# Patient Record
Sex: FEMALE | Race: OTHER | NOT HISPANIC OR LATINO | URBAN - METROPOLITAN AREA
[De-identification: names, ages, dates, MRNs, and addresses within clinical notes are randomized per-mention and may not be internally consistent; named-entity substitution may affect disease eponyms.]

---

## 2019-06-13 ENCOUNTER — EMERGENCY (EMERGENCY)
Facility: HOSPITAL | Age: 34
LOS: 1 days | Discharge: HOME | End: 2019-06-13
Admitting: EMERGENCY MEDICINE
Payer: COMMERCIAL

## 2019-06-13 VITALS
SYSTOLIC BLOOD PRESSURE: 154 MMHG | HEART RATE: 87 BPM | OXYGEN SATURATION: 100 % | DIASTOLIC BLOOD PRESSURE: 106 MMHG | TEMPERATURE: 98 F | RESPIRATION RATE: 15 BRPM

## 2019-06-13 LAB
ALBUMIN SERPL ELPH-MCNC: 4.5 G/DL — SIGNIFICANT CHANGE UP (ref 3.3–5)
ALP SERPL-CCNC: 41 U/L — SIGNIFICANT CHANGE UP (ref 40–120)
ALT FLD-CCNC: 26 U/L — SIGNIFICANT CHANGE UP (ref 4–33)
ANION GAP SERPL CALC-SCNC: 12 MMO/L — SIGNIFICANT CHANGE UP (ref 7–14)
AST SERPL-CCNC: 22 U/L — SIGNIFICANT CHANGE UP (ref 4–32)
BASOPHILS # BLD AUTO: 0.05 K/UL — SIGNIFICANT CHANGE UP (ref 0–0.2)
BASOPHILS NFR BLD AUTO: 0.7 % — SIGNIFICANT CHANGE UP (ref 0–2)
BILIRUB SERPL-MCNC: 0.3 MG/DL — SIGNIFICANT CHANGE UP (ref 0.2–1.2)
BUN SERPL-MCNC: 16 MG/DL — SIGNIFICANT CHANGE UP (ref 7–23)
CALCIUM SERPL-MCNC: 10.3 MG/DL — SIGNIFICANT CHANGE UP (ref 8.4–10.5)
CHLORIDE SERPL-SCNC: 103 MMOL/L — SIGNIFICANT CHANGE UP (ref 98–107)
CO2 SERPL-SCNC: 24 MMOL/L — SIGNIFICANT CHANGE UP (ref 22–31)
CREAT SERPL-MCNC: 0.93 MG/DL — SIGNIFICANT CHANGE UP (ref 0.5–1.3)
EOSINOPHIL # BLD AUTO: 0.22 K/UL — SIGNIFICANT CHANGE UP (ref 0–0.5)
EOSINOPHIL NFR BLD AUTO: 2.9 % — SIGNIFICANT CHANGE UP (ref 0–6)
GLUCOSE SERPL-MCNC: 101 MG/DL — HIGH (ref 70–99)
HCT VFR BLD CALC: 39 % — SIGNIFICANT CHANGE UP (ref 34.5–45)
HGB BLD-MCNC: 12.8 G/DL — SIGNIFICANT CHANGE UP (ref 11.5–15.5)
IMM GRANULOCYTES NFR BLD AUTO: 0.3 % — SIGNIFICANT CHANGE UP (ref 0–1.5)
LYMPHOCYTES # BLD AUTO: 3.6 K/UL — HIGH (ref 1–3.3)
LYMPHOCYTES # BLD AUTO: 47.1 % — HIGH (ref 13–44)
MCHC RBC-ENTMCNC: 29.1 PG — SIGNIFICANT CHANGE UP (ref 27–34)
MCHC RBC-ENTMCNC: 32.8 % — SIGNIFICANT CHANGE UP (ref 32–36)
MCV RBC AUTO: 88.6 FL — SIGNIFICANT CHANGE UP (ref 80–100)
MONOCYTES # BLD AUTO: 0.33 K/UL — SIGNIFICANT CHANGE UP (ref 0–0.9)
MONOCYTES NFR BLD AUTO: 4.3 % — SIGNIFICANT CHANGE UP (ref 2–14)
NEUTROPHILS # BLD AUTO: 3.43 K/UL — SIGNIFICANT CHANGE UP (ref 1.8–7.4)
NEUTROPHILS NFR BLD AUTO: 44.7 % — SIGNIFICANT CHANGE UP (ref 43–77)
NRBC # FLD: 0 K/UL — SIGNIFICANT CHANGE UP (ref 0–0)
PLATELET # BLD AUTO: 293 K/UL — SIGNIFICANT CHANGE UP (ref 150–400)
PMV BLD: 10.9 FL — SIGNIFICANT CHANGE UP (ref 7–13)
POTASSIUM SERPL-MCNC: 4.2 MMOL/L — SIGNIFICANT CHANGE UP (ref 3.5–5.3)
POTASSIUM SERPL-SCNC: 4.2 MMOL/L — SIGNIFICANT CHANGE UP (ref 3.5–5.3)
PROT SERPL-MCNC: 7.5 G/DL — SIGNIFICANT CHANGE UP (ref 6–8.3)
RBC # BLD: 4.4 M/UL — SIGNIFICANT CHANGE UP (ref 3.8–5.2)
RBC # FLD: 11.8 % — SIGNIFICANT CHANGE UP (ref 10.3–14.5)
SODIUM SERPL-SCNC: 139 MMOL/L — SIGNIFICANT CHANGE UP (ref 135–145)
WBC # BLD: 7.65 K/UL — SIGNIFICANT CHANGE UP (ref 3.8–10.5)
WBC # FLD AUTO: 7.65 K/UL — SIGNIFICANT CHANGE UP (ref 3.8–10.5)

## 2019-06-13 PROCEDURE — 99284 EMERGENCY DEPT VISIT MOD MDM: CPT

## 2019-06-13 RX ORDER — KETOROLAC TROMETHAMINE 30 MG/ML
15 SYRINGE (ML) INJECTION ONCE
Refills: 0 | Status: DISCONTINUED | OUTPATIENT
Start: 2019-06-13 | End: 2019-06-13

## 2019-06-13 RX ORDER — DIAZEPAM 5 MG
1 TABLET ORAL
Qty: 9 | Refills: 0
Start: 2019-06-13 | End: 2019-06-15

## 2019-06-13 RX ORDER — DIAZEPAM 5 MG
5 TABLET ORAL ONCE
Refills: 0 | Status: DISCONTINUED | OUTPATIENT
Start: 2019-06-13 | End: 2019-06-13

## 2019-06-13 RX ORDER — LIDOCAINE 4 G/100G
2 CREAM TOPICAL ONCE
Refills: 0 | Status: COMPLETED | OUTPATIENT
Start: 2019-06-13 | End: 2019-06-13

## 2019-06-13 RX ADMIN — Medication 15 MILLIGRAM(S): at 18:05

## 2019-06-13 RX ADMIN — Medication 5 MILLIGRAM(S): at 18:05

## 2019-06-13 RX ADMIN — LIDOCAINE 2 PATCH: 4 CREAM TOPICAL at 18:05

## 2019-06-13 NOTE — ED ADULT TRIAGE NOTE - CHIEF COMPLAINT QUOTE
Pt c/o neck pain and joint pain for the past two weeks.  Pt states was started on lamictal approx 6 weeks ago.  Then approx 2 weeks ago, she started to feel the symptoms and is concerned it is a reaction to the medication.  PMHx: bipolar

## 2019-06-13 NOTE — ED PROVIDER NOTE - MUSCULOSKELETAL NECK EXAM
paracervical and trapezius muscle tenderness and spasm jennifer, however no midline tenderness, neck supple/trachea midline/supple

## 2019-06-13 NOTE — ED PROVIDER NOTE - NS ED ROS FT
Review of Systems:     CONSTITUTIONAL: no fever; no chills, no weight loss  EYES: no eye pain, no blurry vision  ENMT:  no difficulty hearing, no sinus or throat pain  RESPIRATORY: no cough, no shortness of breath   CARDIOVASCULAR: no chest pain, no palpitations, no lower extremity edema, no syncope   GASTROINTESTINAL: no abdominal pain, no nausea, no vomiting, no diarrhea, no constipation, no melena  GENITOURINARY: no dysuria, no frequency, no hematuria, no incontinence  NEUROLOGICAL: no headaches, no dizziness, no numbness, no tingling, no ataxia  SKIN: no itching/burning, no rashes, no lesions   MUSCULOSKELETAL: +neck pain, +back pain

## 2019-06-13 NOTE — ED PROVIDER NOTE - PROGRESS NOTE DETAILS
ERMIAS Pan: Pt reassessed, neck pain and ROM significantly improved. WIll dc w/ valium and pain management referral.

## 2019-06-13 NOTE — ED PROVIDER NOTE - NSFOLLOWUPINSTRUCTIONS_ED_ALL_ED_FT
Follow with your PMD within 48-72 hours.  Rest, no heavy lifting or strenuous activity.  Warm compresses to area. Recommend follow up with pain management to consider trigger point injections, continue Physical Therapy- referral list provided.  Light walking. Continue Naproxen for pain, Valium 5mg every 8 hours as needed for muscle spasm- caution drowsiness/do not drive. Any worsening pain, weakness, numbness, bowel or urinary incontinence return to ER

## 2019-06-13 NOTE — ED PROVIDER NOTE - CLINICAL SUMMARY MEDICAL DECISION MAKING FREE TEXT BOX
35 y/o F w/ neck pain x 1 week sp starting Lamotrigine for bipolar disorder. Already saw an orthopedist who performed xrays and attributed pain to muscle spasm, pt getting physical therapy however continues experiencing pain. Pt is also anxious and worried about her pain, which is likely making muscle spasms and neck stiffness worse. Pt concerned for meningitis although she understands she does not have any other sx of meninigitis including HA, fever, n/v, visual changes. Will treat pain w/ toradol, valium muscle relaxer, will need outpt pain management follow up for likely trigger point injections

## 2019-06-13 NOTE — ED PROVIDER NOTE - OBJECTIVE STATEMENT
35 y/o F PMHx Bipolar (well managed, followed by psych closely) presents w/  at bedside, c/o neck pain x 1 week. Pt states she initially had pain in her R hip, which then traveled to her neck. Feels the pain maximally at the midline of her c-spine, traveling down to her lower back. Saw an orthopedist who performed xrays and attributed pain to muscle spasms, given straightening of cervical curvature on xray. Pt was prescribed physical therapy for 5 weeks, which she has been getting for the past week. States pain is worse w/ rest, better when she tries to do her daily activities and keep busy. Tried both Naproxen and Meloxicam for pain, only getting relief from Naproxen. Pt was not given muscle relaxer. Of note, pt was started on a titration of Lamotrigine prior to onset of neck pain, and attributes sx to that. Pt spoke w/ her psychiatrist, who performed Lamotrigine levels (pending results), but does not believe sx are related to that. Pt is here because she is concerned she has meningitis. Denies headaches, n/v, vision changes, photophobia, or any other complaints.

## 2019-10-22 ENCOUNTER — TRANSCRIPTION ENCOUNTER (OUTPATIENT)
Age: 34
End: 2019-10-22

## 2020-01-13 PROBLEM — F31.81 BIPOLAR II DISORDER: Chronic | Status: ACTIVE | Noted: 2019-06-13

## 2020-01-30 ENCOUNTER — APPOINTMENT (OUTPATIENT)
Dept: OBGYN | Facility: CLINIC | Age: 35
End: 2020-01-30

## 2020-05-05 PROBLEM — Z00.00 ENCOUNTER FOR PREVENTIVE HEALTH EXAMINATION: Status: ACTIVE | Noted: 2020-05-05

## 2020-05-14 ENCOUNTER — OUTPATIENT (OUTPATIENT)
Dept: OUTPATIENT SERVICES | Age: 35
LOS: 1 days | Discharge: HOME | End: 2020-05-14

## 2020-05-15 ENCOUNTER — APPOINTMENT (OUTPATIENT)
Dept: PEDIATRIC CARDIOLOGY | Facility: CLINIC | Age: 35
End: 2020-05-15
Payer: COMMERCIAL

## 2020-05-15 PROCEDURE — 93325 DOPPLER ECHO COLOR FLOW MAPG: CPT | Mod: 59

## 2020-05-15 PROCEDURE — 76820 UMBILICAL ARTERY ECHO: CPT

## 2020-05-15 PROCEDURE — 76825 ECHO EXAM OF FETAL HEART: CPT

## 2020-05-15 PROCEDURE — 76821 MIDDLE CEREBRAL ARTERY ECHO: CPT

## 2020-05-15 PROCEDURE — 76827 ECHO EXAM OF FETAL HEART: CPT

## 2020-05-15 PROCEDURE — 99201 OFFICE OUTPATIENT NEW 10 MINUTES: CPT | Mod: 25

## 2020-09-17 ENCOUNTER — OUTPATIENT (OUTPATIENT)
Dept: INPATIENT UNIT | Facility: HOSPITAL | Age: 35
LOS: 1 days | Discharge: HOME | End: 2020-09-17
Payer: COMMERCIAL

## 2020-09-17 VITALS — TEMPERATURE: 98 F | RESPIRATION RATE: 16 BRPM

## 2020-09-17 VITALS — HEART RATE: 86 BPM | DIASTOLIC BLOOD PRESSURE: 60 MMHG | SYSTOLIC BLOOD PRESSURE: 118 MMHG

## 2020-09-17 DIAGNOSIS — Z3A.00 WEEKS OF GESTATION OF PREGNANCY NOT SPECIFIED: ICD-10-CM

## 2020-09-17 DIAGNOSIS — Z90.89 ACQUIRED ABSENCE OF OTHER ORGANS: Chronic | ICD-10-CM

## 2020-09-17 DIAGNOSIS — O26.899 OTHER SPECIFIED PREGNANCY RELATED CONDITIONS, UNSPECIFIED TRIMESTER: ICD-10-CM

## 2020-09-17 PROCEDURE — 99214 OFFICE O/P EST MOD 30 MIN: CPT

## 2020-09-17 NOTE — OB PROVIDER TRIAGE NOTE - NSOBPROVIDERNOTE_OBGYN_ALL_OB_FT
35 yr old  @ 39.5 wks, r/o labor 35 yr old  @ 39.5 wks, r/o labor  -Fetal status reassured  -NST: cat 1  -TOCO: ctx irregular,   -labor precautions  -Comfortable

## 2020-09-17 NOTE — OB PROVIDER TRIAGE NOTE - HISTORY OF PRESENT ILLNESS
35 yr ol d @ 39.5 wks. presents with reports of overactive fetal movement with occasional ctx. Denies VB/LOF. GBS negative, [id number]  PNI:  marginal cord insertion, fetal growth wnl  Obhx: 2012, FT , 8lb            2010, FT, VSVD, 7.2 lbs  Gynhx: denies  Surghx: denies  Gynhx: denies 35 yr ol d @ 39.5 wks. presents with reports of overactive fetal movement with occasional ctx. Denies VB/LOF. GBS negative, [id number]  PNI:  marginal cord insertion, fetal growth wnl, ultrasound in office in AM wnl  Obhx: 2012, FT , 8lb            2010, FT, VSVD, 7.2 lbs  Gynhx: denies  Surghx: denies  Gynhx: denies

## 2020-09-17 NOTE — OB PROVIDER TRIAGE NOTE - NSHPPHYSICALEXAM_GEN_ALL_CORE
VS: 114/69  TOCO: ctx  10 min  NST:  , +accel, -decels, moderate variability  VE; VS: 114/69  TOCO: ctx  10 min  NST:  , +accel, -decels, moderate variability  VE; FT/L/H  Sono: presentation only

## 2020-09-21 ENCOUNTER — TRANSCRIPTION ENCOUNTER (OUTPATIENT)
Age: 35
End: 2020-09-21

## 2020-09-22 ENCOUNTER — INPATIENT (INPATIENT)
Facility: HOSPITAL | Age: 35
LOS: 1 days | Discharge: HOME | End: 2020-09-24
Attending: OBSTETRICS & GYNECOLOGY | Admitting: OBSTETRICS & GYNECOLOGY
Payer: COMMERCIAL

## 2020-09-22 ENCOUNTER — RESULT REVIEW (OUTPATIENT)
Age: 35
End: 2020-09-22

## 2020-09-22 VITALS
TEMPERATURE: 98 F | DIASTOLIC BLOOD PRESSURE: 74 MMHG | HEART RATE: 83 BPM | SYSTOLIC BLOOD PRESSURE: 125 MMHG | RESPIRATION RATE: 18 BRPM

## 2020-09-22 DIAGNOSIS — Z90.89 ACQUIRED ABSENCE OF OTHER ORGANS: Chronic | ICD-10-CM

## 2020-09-22 DIAGNOSIS — O26.899 OTHER SPECIFIED PREGNANCY RELATED CONDITIONS, UNSPECIFIED TRIMESTER: ICD-10-CM

## 2020-09-22 DIAGNOSIS — Z3A.00 WEEKS OF GESTATION OF PREGNANCY NOT SPECIFIED: ICD-10-CM

## 2020-09-22 LAB
ANTIBODY ID 1_1: SIGNIFICANT CHANGE UP
BASOPHILS # BLD AUTO: 0.05 K/UL — SIGNIFICANT CHANGE UP (ref 0–0.2)
BASOPHILS NFR BLD AUTO: 0.4 % — SIGNIFICANT CHANGE UP (ref 0–2)
BLD GP AB SCN SERPL QL: POSITIVE — SIGNIFICANT CHANGE UP
DAT POLY-SP REAG RBC QL: NEGATIVE — SIGNIFICANT CHANGE UP
EOSINOPHIL # BLD AUTO: 0.1 K/UL — SIGNIFICANT CHANGE UP (ref 0–0.5)
EOSINOPHIL NFR BLD AUTO: 0.8 % — SIGNIFICANT CHANGE UP (ref 0–6)
HCT VFR BLD CALC: 38.6 % — SIGNIFICANT CHANGE UP (ref 34.5–45)
HGB BLD-MCNC: 12.6 G/DL — SIGNIFICANT CHANGE UP (ref 11.5–15.5)
IMM GRANULOCYTES NFR BLD AUTO: 0.9 % — SIGNIFICANT CHANGE UP (ref 0–1.5)
LYMPHOCYTES # BLD AUTO: 26.1 % — SIGNIFICANT CHANGE UP (ref 13–44)
LYMPHOCYTES # BLD AUTO: 3.2 K/UL — SIGNIFICANT CHANGE UP (ref 1–3.3)
MCHC RBC-ENTMCNC: 29 PG — SIGNIFICANT CHANGE UP (ref 27–34)
MCHC RBC-ENTMCNC: 32.6 % — SIGNIFICANT CHANGE UP (ref 32–36)
MCV RBC AUTO: 88.7 FL — SIGNIFICANT CHANGE UP (ref 80–100)
MONOCYTES # BLD AUTO: 0.6 K/UL — SIGNIFICANT CHANGE UP (ref 0–0.9)
MONOCYTES NFR BLD AUTO: 4.9 % — SIGNIFICANT CHANGE UP (ref 2–14)
NEUTROPHILS # BLD AUTO: 8.18 K/UL — HIGH (ref 1.8–7.4)
NEUTROPHILS NFR BLD AUTO: 66.9 % — SIGNIFICANT CHANGE UP (ref 43–77)
NRBC # FLD: 0 K/UL — SIGNIFICANT CHANGE UP (ref 0–0)
PLATELET # BLD AUTO: 204 K/UL — SIGNIFICANT CHANGE UP (ref 150–400)
PMV BLD: 11.9 FL — SIGNIFICANT CHANGE UP (ref 7–13)
RBC # BLD: 4.35 M/UL — SIGNIFICANT CHANGE UP (ref 3.8–5.2)
RBC # FLD: 12.8 % — SIGNIFICANT CHANGE UP (ref 10.3–14.5)
RH IG SCN BLD-IMP: NEGATIVE — SIGNIFICANT CHANGE UP
RH IG SCN BLD-IMP: NEGATIVE — SIGNIFICANT CHANGE UP
SARS-COV-2 RNA SPEC QL NAA+PROBE: SIGNIFICANT CHANGE UP
WBC # BLD: 12.24 K/UL — HIGH (ref 3.8–10.5)
WBC # FLD AUTO: 12.24 K/UL — HIGH (ref 3.8–10.5)

## 2020-09-22 PROCEDURE — 88307 TISSUE EXAM BY PATHOLOGIST: CPT | Mod: 26

## 2020-09-22 PROCEDURE — 86077 PHYS BLOOD BANK SERV XMATCH: CPT

## 2020-09-22 RX ORDER — OXYCODONE HYDROCHLORIDE 5 MG/1
5 TABLET ORAL
Refills: 0 | Status: DISCONTINUED | OUTPATIENT
Start: 2020-09-22 | End: 2020-09-24

## 2020-09-22 RX ORDER — MAGNESIUM HYDROXIDE 400 MG/1
30 TABLET, CHEWABLE ORAL
Refills: 0 | Status: DISCONTINUED | OUTPATIENT
Start: 2020-09-22 | End: 2020-09-24

## 2020-09-22 RX ORDER — TETANUS TOXOID, REDUCED DIPHTHERIA TOXOID AND ACELLULAR PERTUSSIS VACCINE, ADSORBED 5; 2.5; 8; 8; 2.5 [IU]/.5ML; [IU]/.5ML; UG/.5ML; UG/.5ML; UG/.5ML
0.5 SUSPENSION INTRAMUSCULAR ONCE
Refills: 0 | Status: DISCONTINUED | OUTPATIENT
Start: 2020-09-22 | End: 2020-09-24

## 2020-09-22 RX ORDER — ACETAMINOPHEN 500 MG
975 TABLET ORAL
Refills: 0 | Status: DISCONTINUED | OUTPATIENT
Start: 2020-09-22 | End: 2020-09-24

## 2020-09-22 RX ORDER — SIMETHICONE 80 MG/1
80 TABLET, CHEWABLE ORAL EVERY 4 HOURS
Refills: 0 | Status: DISCONTINUED | OUTPATIENT
Start: 2020-09-22 | End: 2020-09-24

## 2020-09-22 RX ORDER — NALOXONE HYDROCHLORIDE 4 MG/.1ML
0.1 SPRAY NASAL
Refills: 0 | Status: DISCONTINUED | OUTPATIENT
Start: 2020-09-23 | End: 2020-09-23

## 2020-09-22 RX ORDER — AZTREONAM 2 G
2000 VIAL (EA) INJECTION EVERY 12 HOURS
Refills: 0 | Status: COMPLETED | OUTPATIENT
Start: 2020-09-23 | End: 2020-09-24

## 2020-09-22 RX ORDER — HYDROMORPHONE HYDROCHLORIDE 2 MG/ML
1 INJECTION INTRAMUSCULAR; INTRAVENOUS; SUBCUTANEOUS
Refills: 0 | Status: DISCONTINUED | OUTPATIENT
Start: 2020-09-23 | End: 2020-09-23

## 2020-09-22 RX ORDER — OXYTOCIN 10 UNIT/ML
333.33 VIAL (ML) INJECTION
Qty: 20 | Refills: 0 | Status: DISCONTINUED | OUTPATIENT
Start: 2020-09-22 | End: 2020-09-23

## 2020-09-22 RX ORDER — ACETAMINOPHEN 500 MG
1000 TABLET ORAL ONCE
Refills: 0 | Status: DISCONTINUED | OUTPATIENT
Start: 2020-09-22 | End: 2020-09-22

## 2020-09-22 RX ORDER — KETOROLAC TROMETHAMINE 30 MG/ML
30 SYRINGE (ML) INJECTION EVERY 6 HOURS
Refills: 0 | Status: COMPLETED | OUTPATIENT
Start: 2020-09-22 | End: 2020-09-23

## 2020-09-22 RX ORDER — METOCLOPRAMIDE HCL 10 MG
10 TABLET ORAL ONCE
Refills: 0 | Status: COMPLETED | OUTPATIENT
Start: 2020-09-22 | End: 2020-09-22

## 2020-09-22 RX ORDER — BUTORPHANOL TARTRATE 2 MG/ML
0.12 INJECTION, SOLUTION INTRAMUSCULAR; INTRAVENOUS EVERY 6 HOURS
Refills: 0 | Status: DISCONTINUED | OUTPATIENT
Start: 2020-09-23 | End: 2020-09-23

## 2020-09-22 RX ORDER — SODIUM CHLORIDE 9 MG/ML
1000 INJECTION, SOLUTION INTRAVENOUS
Refills: 0 | Status: DISCONTINUED | OUTPATIENT
Start: 2020-09-22 | End: 2020-09-22

## 2020-09-22 RX ORDER — OXYCODONE HYDROCHLORIDE 5 MG/1
5 TABLET ORAL
Refills: 0 | Status: DISCONTINUED | OUTPATIENT
Start: 2020-09-23 | End: 2020-09-24

## 2020-09-22 RX ORDER — FAMOTIDINE 10 MG/ML
20 INJECTION INTRAVENOUS ONCE
Refills: 0 | Status: COMPLETED | OUTPATIENT
Start: 2020-09-22 | End: 2020-09-22

## 2020-09-22 RX ORDER — LANOLIN
1 OINTMENT (GRAM) TOPICAL EVERY 6 HOURS
Refills: 0 | Status: DISCONTINUED | OUTPATIENT
Start: 2020-09-22 | End: 2020-09-24

## 2020-09-22 RX ORDER — ONDANSETRON 8 MG/1
4 TABLET, FILM COATED ORAL EVERY 6 HOURS
Refills: 0 | Status: DISCONTINUED | OUTPATIENT
Start: 2020-09-23 | End: 2020-09-24

## 2020-09-22 RX ORDER — SODIUM CHLORIDE 9 MG/ML
300 INJECTION INTRAMUSCULAR; INTRAVENOUS; SUBCUTANEOUS ONCE
Refills: 0 | Status: COMPLETED | OUTPATIENT
Start: 2020-09-22 | End: 2020-09-22

## 2020-09-22 RX ORDER — OXYCODONE HYDROCHLORIDE 5 MG/1
5 TABLET ORAL ONCE
Refills: 0 | Status: DISCONTINUED | OUTPATIENT
Start: 2020-09-22 | End: 2020-09-24

## 2020-09-22 RX ORDER — DIPHENHYDRAMINE HCL 50 MG
25 CAPSULE ORAL EVERY 6 HOURS
Refills: 0 | Status: DISCONTINUED | OUTPATIENT
Start: 2020-09-22 | End: 2020-09-24

## 2020-09-22 RX ORDER — AZTREONAM 2 G
VIAL (EA) INJECTION
Refills: 0 | Status: COMPLETED | OUTPATIENT
Start: 2020-09-22 | End: 2020-09-25

## 2020-09-22 RX ORDER — AZTREONAM 2 G
2000 VIAL (EA) INJECTION ONCE
Refills: 0 | Status: COMPLETED | OUTPATIENT
Start: 2020-09-22 | End: 2020-09-22

## 2020-09-22 RX ORDER — HEPARIN SODIUM 5000 [USP'U]/ML
5000 INJECTION INTRAVENOUS; SUBCUTANEOUS EVERY 12 HOURS
Refills: 0 | Status: DISCONTINUED | OUTPATIENT
Start: 2020-09-22 | End: 2020-09-24

## 2020-09-22 RX ORDER — CITRIC ACID/SODIUM CITRATE 300-500 MG
30 SOLUTION, ORAL ORAL ONCE
Refills: 0 | Status: COMPLETED | OUTPATIENT
Start: 2020-09-22 | End: 2020-09-22

## 2020-09-22 RX ORDER — OXYTOCIN 10 UNIT/ML
VIAL (ML) INJECTION
Qty: 20 | Refills: 0 | Status: DISCONTINUED | OUTPATIENT
Start: 2020-09-22 | End: 2020-09-22

## 2020-09-22 RX ORDER — FERROUS SULFATE 325(65) MG
325 TABLET ORAL DAILY
Refills: 0 | Status: DISCONTINUED | OUTPATIENT
Start: 2020-09-22 | End: 2020-09-23

## 2020-09-22 RX ORDER — SODIUM CHLORIDE 9 MG/ML
500 INJECTION INTRAMUSCULAR; INTRAVENOUS; SUBCUTANEOUS
Refills: 0 | Status: DISCONTINUED | OUTPATIENT
Start: 2020-09-22 | End: 2020-09-22

## 2020-09-22 RX ORDER — IBUPROFEN 200 MG
600 TABLET ORAL EVERY 6 HOURS
Refills: 0 | Status: COMPLETED | OUTPATIENT
Start: 2020-09-22 | End: 2021-08-21

## 2020-09-22 RX ORDER — OXYTOCIN 10 UNIT/ML
2 VIAL (ML) INJECTION
Qty: 30 | Refills: 0 | Status: DISCONTINUED | OUTPATIENT
Start: 2020-09-22 | End: 2020-09-22

## 2020-09-22 RX ADMIN — Medication 100 MILLIGRAM(S): at 15:26

## 2020-09-22 RX ADMIN — Medication 0.25 MILLIGRAM(S): at 10:31

## 2020-09-22 RX ADMIN — FAMOTIDINE 20 MILLIGRAM(S): 10 INJECTION INTRAVENOUS at 12:43

## 2020-09-22 RX ADMIN — Medication 975 MILLIGRAM(S): at 21:16

## 2020-09-22 RX ADMIN — HEPARIN SODIUM 5000 UNIT(S): 5000 INJECTION INTRAVENOUS; SUBCUTANEOUS at 21:16

## 2020-09-22 RX ADMIN — Medication 975 MILLIGRAM(S): at 22:00

## 2020-09-22 RX ADMIN — Medication 10 MILLIGRAM(S): at 12:43

## 2020-09-22 RX ADMIN — SODIUM CHLORIDE 300 MILLILITER(S): 9 INJECTION INTRAMUSCULAR; INTRAVENOUS; SUBCUTANEOUS at 12:30

## 2020-09-22 RX ADMIN — Medication 30 MILLILITER(S): at 12:43

## 2020-09-22 RX ADMIN — Medication 0.25 MILLIGRAM(S): at 12:42

## 2020-09-22 RX ADMIN — SODIUM CHLORIDE 125 MILLILITER(S): 9 INJECTION, SOLUTION INTRAVENOUS at 08:14

## 2020-09-22 RX ADMIN — Medication 325 MILLIGRAM(S): at 21:16

## 2020-09-22 NOTE — PROVIDER CONTACT NOTE (OTHER) - SITUATION
Pt H&P states that pt has been diagnosed with Bipolar 2 disorder. Pt did not mention this to the night nurse during admission. Brought to attention during AM safety rounds.

## 2020-09-22 NOTE — CHART NOTE - NSCHARTNOTEFT_GEN_A_CORE
R4 OB Chart Note     Patient seen at bedside with Dr. Gaines - patient remains remote from delivery with variable decelerations unresponsive to amnioinfusion. Counseled patient that recommend to proceed with  delivery. Patient and  agree and consents signed upon discussion of risks/benefits. Huddle with nursing and Dr. Carvalho of anesthesia, will proceed to OR now.     GITA Rebollar PGY4

## 2020-09-22 NOTE — CHART NOTE - NSCHARTNOTEFT_GEN_A_CORE
R4 OB Chart Note     Patient seen for cervical change     Vital Signs Last 24 Hrs  T(C): 36.7 (22 Sep 2020 11:38), Max: 37.4 (22 Sep 2020 10:57)  T(F): 98.06 (22 Sep 2020 11:38), Max: 99.32 (22 Sep 2020 10:57)  HR: 96 (22 Sep 2020 12:08) (45 - 110)  BP: 117/64 (22 Sep 2020 12:08) (99/73 - 139/86)  RR: 16 (22 Sep 2020 04:28) (16 - 18)  SpO2: 100% (22 Sep 2020 12:07) (92% - 100%)    VE: 8/90/-3, LOP position  FHT: 150/mod/accels-/intermittent variable decels  [place]: q1-3 min     A/P: 34yo  at 40w3d presenting in labor, with tracing recovered to category 1, now with variable deceleration noted upon examination, in stable condition.   - Allow tracing to recover and anesthesia called for reinforcement dosing   - If variable persist, will begin amnioinfusion, as IUPC/ISE in place   - Would consider pitocin with recovery of tracing     Dw Dr. Eder Rebollar PGY4

## 2020-09-22 NOTE — OB PROVIDER DELIVERY SUMMARY - NSPROVIDERDELIVERYNOTE_OBGYN_ALL_OB_FT
viable male infant, APGARS 9/9, wt 3370g, RUTH, light mec  hysterotomy with left side extension repaired with caprosyn  hemostasis of hysterotomy achieved with imbricating throws of caprosyn in figure of 8, +fibrillar  grossly normal uterus, tubes and ovaries b/l  bladder retrograde filled with 300cc methylene blue, w/o extravasation  fascia closed with vicryl, subQ closed with plain gut, subcuticular vicryl  1000/2500/500

## 2020-09-22 NOTE — CHART NOTE - NSCHARTNOTEFT_GEN_A_CORE
R4 OB Chart Note    Patient seen at bedside with Dr. Sharpe, noted to have mild variable decelerations    Vital Signs Last 24 Hrs  T(C): 37.0 (22 Sep 2020 08:58), Max: 37.0 (22 Sep 2020 08:58)  T(F): 98.6 (22 Sep 2020 08:58), Max: 98.6 (22 Sep 2020 08:58)  HR: 82 (22 Sep 2020 10:27) (45 - 105)  BP: 120/71 (22 Sep 2020 10:24) (99/73 - 139/86)  RR: 16 (22 Sep 2020 04:28) (16 - 18)  SpO2: 100% (22 Sep 2020 10:27) (92% - 100%)    VE: 8/100/-3, LOP position   FHT: 145/mod/accels+/variable decelerations noted   [place]: q1-2min     A/P: 36yo  at 40w3d presenting in labor, now with variable decelerations, in stable condition.   - IUPC placed by Dr. Gaines with contractions noted q1-2 min   - Resuscitation in progress   - Terbutaline x1 given to decrease contraction frequency and allow resuscitation  - Will continue to monitor     Seen w/ Dr. Sharpe  LRobinson PGY4

## 2020-09-22 NOTE — CHART NOTE - NSCHARTNOTEFT_GEN_A_CORE
R2 Progress Note    Pt checked for cervical change after epidural.    Vital Signs Last 24 Hrs  T(C): 36.6 (22 Sep 2020 05:09), Max: 36.6 (22 Sep 2020 04:28)  HR: 82 (22 Sep 2020 07:02) (67 - 93)  BP: 116/64 (22 Sep 2020 06:53) (111/69 - 137/64)  RR: 16 (22 Sep 2020 04:28) (16 - 18)  SpO2: 100% (22 Sep 2020 06:57) (100% - 100%)    /mod/-accel/-decel  [place] q2-3min  VE 6/80/-3    peanut ball  epi in place  expt mgmt    D/w L Smooth R4  Sonia Hough R2

## 2020-09-22 NOTE — PROVIDER CONTACT NOTE (OTHER) - BACKGROUND
pt s/p primary c/s for category II tracing to a full term . pt hx of bipolar disorder 2, anxiety, depression, VAVD  FT and  in .
Pt is a G3 now P 3003. Pt has a hx of Post partum depression after  in 2012. Dr Faust reached out to Dr Arango who stated that pt has a GYN history of anxiety and depression.

## 2020-09-22 NOTE — CHART NOTE - NSCHARTNOTEFT_GEN_A_CORE
NP note    Called to room by RN to assess pt for c/o yellow spot floaters x2 episodes for "couple of minutes" 40 minutes ago upon waking and 5 minutes ago.   Report she gets the same floaters before if she is experiencing nasal congestion. Denies hx of migraines or any BP issues.  States she only got 2 hours of sleep and it may be due to waking up.  Denies headache, dizziness, chest pain or sob.   VSS, normotensive, afebrile  CAT I tracing ,ctx q-2-3min  SVE 15 minutes ago 6/80/-3 high on peanut ball    -For VE @9am and possible AROM  -Continue monitor closely    sredze, NP

## 2020-09-22 NOTE — CHART NOTE - NSCHARTNOTEFT_GEN_A_CORE
Pt was seen and evaluated at bedside.     POD#0 from a pLTCS for NRFHT. Pt states that she is feeling anxious and slightly lightheaded. Pt states that she has a hx of panic attack but denies ever being on medication. Pt states that she is feeling anxious from being in the PACU for too long and for wearing the SCDs which are constraining her movement.     VSS  RR  CTAB  Vaginal: minimal lochia  Abd: appropriately tender, firm uterus  Rose in site    A: Pt's signs/symptoms are consistent with an exacerbation of her underlying anxiety.  -continue with IV fluids  -SW consult placed  - counseled pt about the standard postoperative procedure and reassured her that she will be moved to the floors as soon as she meets the appropriate postoperative milestone.    Simone Castillo PGY1  D/w Dr. Orr Pt was seen and evaluated at bedside.     POD#0 from a pLTCS for NRFHT. Pt states that she is feeling anxious and slightly lightheaded. Pt states that she has a hx of panic attack but denies ever being on medication. Pt states that she is feeling anxious from being in the PACU for too long and for wearing the SCDs which are constraining her movement. Pt is tolerating PO fluids.    VSS  RR  CTAB  Vaginal: minimal lochia  Abd: appropriately tender, firm uterus  Rose in site    UOP: adequate    A: Pt's signs/symptoms are consistent with an exacerbation of her underlying anxiety.  -continue with IV fluids  -SW consult placed  - counseled pt about the standard postoperative procedure and reassured her that she will be moved to the floors as soon as she meets the appropriate postoperative milestone.  -AM CBC or PRN as indicated.  - will re-evaluate in the AM the need to place a Psych consult    Simone Castillo PGY1  D/w Dr. Orr

## 2020-09-22 NOTE — OB PROVIDER TRIAGE NOTE - HISTORY OF PRESENT ILLNESS
Dr. Arango's pt. is a 34y/o  EGA 40.3wks reports abdominal contractions since 12am every 3-4mins. Pain 8/10. Pt. denies leakage of fluid and vaginal bleeding. Pt. reports good fetal movement.

## 2020-09-22 NOTE — PROVIDER CONTACT NOTE (OTHER) - ASSESSMENT
pt axox4. vitals are stable ( see flowsheet). pt lungs clear. pt fundus is firm and at umbilicus. pt anxious. pt had panic attack while in PACU. RN explained treatment process, medications and care to patient. R pt states she would like to speak with patient about dizziness. pt refuses to put in lying position. pt tolerating po fluids.
Pt stated she was diagnosed with Bipolar 2 disorder but both the pt and  disagree with the diagnosis. Pt reports taking medications for a hx of anxiety and depression - pt stated she took abilify 2mg prior to pregnancy. Pt stated she was trying other medications as well- simbalta, trintelex, welbutrin. Pt stated she had a bad reaction to lamictal. Pt stated NO medications during pregnancy

## 2020-09-22 NOTE — OB PROVIDER TRIAGE NOTE - NSHPPHYSICALEXAM_GEN_ALL_CORE
BP: 125/74, HR: 83, Temp: 36.5  Abdomen soft nontender  SVE: 3/70/-3  TAS:   GBS: Neg. as per pt  EFW: 3400gms by leopolds  FHR: 135bpm, moderate variability, accelerations, no decelerations  [place] every 3-5mins

## 2020-09-22 NOTE — OB NEONATOLOGY/PEDIATRICIAN DELIVERY SUMMARY - NSPEDSNEONOTESA_OBGYN_ALL_OB_FT
Baby Boy Yi born at 40+3 via an emergent C/S for NRFHT to a 37yo  A- (Rhogam at 28w), PNL neg/NR/I, GBS neg () mother. Maternal hx significant for bipolar disorder type 2 (no meds during pregnancy), anxiety and depression (welbutrin and abilify prior to pregnancy). Covid negative . Baby emerged vigorous and crying through meconium stained fluid. Delayed cord clamping 30s. WDSS. Apgar /9. Admit to . Wants to breastfeed, wants Hep B vaccine, wants circ. Peds: [facility]. NICU attending Dr. Alfredo present at delivery.

## 2020-09-22 NOTE — OB RN DELIVERY SUMMARY - NS_SEPSISRSKCALC_OBGYN_ALL_OB_FT
EOS calculated successfully. EOS Risk Factor: 0.5/1000 live births ([facility] [facility] [place] incidence[place] GA=40w3d; Temp=99.32; ROM=4.583; GBS='Negative'; Antibiotics='No antibiotics or any antibiotics < 2 hrs prior to birth'

## 2020-09-23 LAB
BASOPHILS # BLD AUTO: 0.04 K/UL — SIGNIFICANT CHANGE UP (ref 0–0.2)
BASOPHILS NFR BLD AUTO: 0.3 % — SIGNIFICANT CHANGE UP (ref 0–2)
EOSINOPHIL # BLD AUTO: 0.03 K/UL — SIGNIFICANT CHANGE UP (ref 0–0.5)
EOSINOPHIL NFR BLD AUTO: 0.2 % — SIGNIFICANT CHANGE UP (ref 0–6)
HCT VFR BLD CALC: 24.7 % — LOW (ref 34.5–45)
HGB BLD-MCNC: 8.2 G/DL — LOW (ref 11.5–15.5)
IMM GRANULOCYTES NFR BLD AUTO: 0.9 % — SIGNIFICANT CHANGE UP (ref 0–1.5)
LYMPHOCYTES # BLD AUTO: 15.2 % — SIGNIFICANT CHANGE UP (ref 13–44)
LYMPHOCYTES # BLD AUTO: 2.29 K/UL — SIGNIFICANT CHANGE UP (ref 1–3.3)
MCHC RBC-ENTMCNC: 29.4 PG — SIGNIFICANT CHANGE UP (ref 27–34)
MCHC RBC-ENTMCNC: 33.2 % — SIGNIFICANT CHANGE UP (ref 32–36)
MCV RBC AUTO: 88.5 FL — SIGNIFICANT CHANGE UP (ref 80–100)
MONOCYTES # BLD AUTO: 0.75 K/UL — SIGNIFICANT CHANGE UP (ref 0–0.9)
MONOCYTES NFR BLD AUTO: 5 % — SIGNIFICANT CHANGE UP (ref 2–14)
NEUTROPHILS # BLD AUTO: 11.85 K/UL — HIGH (ref 1.8–7.4)
NEUTROPHILS NFR BLD AUTO: 78.4 % — HIGH (ref 43–77)
NRBC # FLD: 0 K/UL — SIGNIFICANT CHANGE UP (ref 0–0)
PLATELET # BLD AUTO: 137 K/UL — LOW (ref 150–400)
PMV BLD: 11.4 FL — SIGNIFICANT CHANGE UP (ref 7–13)
RBC # BLD FETUS AUTO: 0.1 — SIGNIFICANT CHANGE UP
RBC # BLD: 2.79 M/UL — LOW (ref 3.8–5.2)
RBC # FLD: 13 % — SIGNIFICANT CHANGE UP (ref 10.3–14.5)
WBC # BLD: 15.1 K/UL — HIGH (ref 3.8–10.5)
WBC # FLD AUTO: 15.1 K/UL — HIGH (ref 3.8–10.5)

## 2020-09-23 RX ORDER — IBUPROFEN 200 MG
600 TABLET ORAL EVERY 6 HOURS
Refills: 0 | Status: DISCONTINUED | OUTPATIENT
Start: 2020-09-23 | End: 2020-09-24

## 2020-09-23 RX ORDER — SENNA PLUS 8.6 MG/1
2 TABLET ORAL AT BEDTIME
Refills: 0 | Status: DISCONTINUED | OUTPATIENT
Start: 2020-09-23 | End: 2020-09-24

## 2020-09-23 RX ORDER — FERROUS SULFATE 325(65) MG
325 TABLET ORAL THREE TIMES A DAY
Refills: 0 | Status: DISCONTINUED | OUTPATIENT
Start: 2020-09-23 | End: 2020-09-24

## 2020-09-23 RX ADMIN — Medication 100 MILLIGRAM(S): at 17:50

## 2020-09-23 RX ADMIN — Medication 1 TABLET(S): at 09:36

## 2020-09-23 RX ADMIN — MAGNESIUM HYDROXIDE 30 MILLILITER(S): 400 TABLET, CHEWABLE ORAL at 19:00

## 2020-09-23 RX ADMIN — Medication 975 MILLIGRAM(S): at 10:20

## 2020-09-23 RX ADMIN — Medication 30 MILLIGRAM(S): at 06:45

## 2020-09-23 RX ADMIN — Medication 975 MILLIGRAM(S): at 17:50

## 2020-09-23 RX ADMIN — Medication 600 MILLIGRAM(S): at 14:45

## 2020-09-23 RX ADMIN — SIMETHICONE 80 MILLIGRAM(S): 80 TABLET, CHEWABLE ORAL at 19:00

## 2020-09-23 RX ADMIN — HEPARIN SODIUM 5000 UNIT(S): 5000 INJECTION INTRAVENOUS; SUBCUTANEOUS at 09:35

## 2020-09-23 RX ADMIN — Medication 600 MILLIGRAM(S): at 22:00

## 2020-09-23 RX ADMIN — Medication 975 MILLIGRAM(S): at 09:35

## 2020-09-23 RX ADMIN — SENNA PLUS 2 TABLET(S): 8.6 TABLET ORAL at 22:13

## 2020-09-23 RX ADMIN — HEPARIN SODIUM 5000 UNIT(S): 5000 INJECTION INTRAVENOUS; SUBCUTANEOUS at 21:12

## 2020-09-23 RX ADMIN — MAGNESIUM HYDROXIDE 30 MILLILITER(S): 400 TABLET, CHEWABLE ORAL at 06:47

## 2020-09-23 RX ADMIN — Medication 325 MILLIGRAM(S): at 13:42

## 2020-09-23 RX ADMIN — Medication 30 MILLIGRAM(S): at 02:30

## 2020-09-23 RX ADMIN — Medication 100 MILLIGRAM(S): at 05:16

## 2020-09-23 RX ADMIN — Medication 325 MILLIGRAM(S): at 22:13

## 2020-09-23 RX ADMIN — Medication 600 MILLIGRAM(S): at 21:12

## 2020-09-23 RX ADMIN — Medication 30 MILLIGRAM(S): at 01:30

## 2020-09-23 RX ADMIN — Medication 600 MILLIGRAM(S): at 14:00

## 2020-09-23 RX ADMIN — SIMETHICONE 80 MILLIGRAM(S): 80 TABLET, CHEWABLE ORAL at 13:50

## 2020-09-23 RX ADMIN — Medication 975 MILLIGRAM(S): at 18:09

## 2020-09-23 NOTE — LACTATION INITIAL EVALUATION - INTERVENTION OUTCOME
mother needed  a lot of assistance   with latch and  positioning  . nbn  had  formula  and reviewed  risks  of  formula  .  rn aware  of  visit   and  assessment./verbalizes understanding

## 2020-09-23 NOTE — PROGRESS NOTE ADULT - SUBJECTIVE AND OBJECTIVE BOX
Postop Day  __1_ s/p   C- Section    THERAPY:    [  ] Spinal morphine ____ mg  [ x ] Epidural morphine ___ mg  [  ] IV PCA Hydromophone 1 mg/ml    OBJECTIVE:    Sedation Score:	  [ x ] Alert	    [  ] Drowsy        [  ] Arousable	[  ] Asleep	[  ] Unresponsive    Side Effects:	  [ x ] None	     [  ] Nausea        [  ] Pruritus        [  ] Weaknes   [  ] Numbness   [  ] Other:        ASSESSMENT/ PLAN  [  ] Continue   [  ] Discpntinue   [ x ]Documentation and Verification of current medications     Comments:

## 2020-09-23 NOTE — PROGRESS NOTE ADULT - SUBJECTIVE AND OBJECTIVE BOX
OB Progress Note:  Delivery, POD#1    S: 34yo with hx of anxiety and panic attacks POD#1 s/p pLTCS for NRFHT. Pt states that her anxiety has improved significantly since we last spoke yesterday. She denies the feeling of lightheadedness/dizziness this morning. Pain well controlled, tolerating regular diet, not yet passing flatus, ambulating without difficulty, voiding spontaneously. Denies heavy vaginal bleeding, N/V, CP/SOB.     O:   Vital Signs Last 24 Hrs  T(C): 36.7 (23 Sep 2020 05:21), Max: 37.4 (22 Sep 2020 10:57)  T(F): 98.1 (23 Sep 2020 05:21), Max: 99.32 (22 Sep 2020 10:57)  HR: 98 (23 Sep 2020 05:21) (45 - 110)  BP: 113/70 (23 Sep 2020 05:21) (97/52 - 139/86)  BP(mean): 78 (22 Sep 2020 17:00) (70 - 78)  RR: 18 (23 Sep 2020 05:21) (15 - 20)  SpO2: 100% (23 Sep 2020 05:21) (92% - 100%)    Labs:  Blood type: A Negative  Rubella IgG: RPR:                           8.2<L>   15.10<H> >-----------< 137<L>    (  @ 06:48 )             24.7<L>                        12.6   12.24<H> >-----------< 204    (  @ 04:20 )             38.6                  PE:  General: NAD  Abdomen: Mildly distended, appropriately tender, Fundus firm, incision c/d/i.  VE: No heavy vaginal bleeding  Extremities: No erythema, no pitting edema

## 2020-09-23 NOTE — LACTATION INITIAL EVALUATION - LACTATION INTERVENTIONS
assisted with deep latch and positioning discussed  signs  of  effective  feeding and  swallowing. discussed  compression at  breast when  nbn  stops  drinking  and  is  still sucking. instructed  to offer both  breast at a feeding ,feed on cue and safe  skin to skin.  reviewed  breastfeeding  log  and daily  nbn  goals./initiate skin to skin/initiate hand expression routine

## 2020-09-23 NOTE — PROGRESS NOTE ADULT - PROBLEM SELECTOR PLAN 1
- Continue regular diet  - Increase ambulation  - Continue motrin, tylenol, oxycodone PRN for pain control.    - SW consult   - start FeSO4 and vit C    Simone Castillo, PGY-1

## 2020-09-23 NOTE — PROGRESS NOTE ADULT - ASSESSMENT
A/P: 34yo with hx of anxiety and panic attacks POD#1 s/p pLTCS for NRFHT. Pt hematocrit is notable for a significant drop from 38.6-> 24.7.  Patient is stable and doing well post-operatively.

## 2020-09-23 NOTE — PROGRESS NOTE ADULT - ATTENDING COMMENTS
Associate Chief of L & D ( late entry)    OB Progress Note:  Delivery, POD#1    S: 34yo POD#1 s/p emergent LTCS . Patient with several concerns this morning    O:   Vital Signs Last 24 Hrs  T(C): 36.7 (23 Sep 2020 10:16), Max: 36.9 (22 Sep 2020 21:18)  T(F): 98.1 (23 Sep 2020 10:16), Max: 98.4 (22 Sep 2020 21:18)  HR: 100 (23 Sep 2020 10:16) (80 - 110)  BP: 108/70 (23 Sep 2020 10:16) (97/52 - 123/74)  BP(mean): 78 (22 Sep 2020 17:00) (70 - 78)  RR: 18 (23 Sep 2020 10:16) (15 - 20)  SpO2: 98% (23 Sep 2020 10:16) (94% - 100%)    Labs:  Blood type: A Negative  Rubella IgG: RPR:                           8.2<L>   15.10<H> >-----------< 137<L>    (  @ 06:48 )             24.7<L>                        12.6   12.24<H> >-----------< 204    (  @ 04:20 )             38.6        PE:  General: NAD  Abdomen: Mildly distended, appropriately tender  incision c/d/i.   Perinieum: lochia scant  Extremities: No erythema, trace edema    A/P: 34yo POD#1 s/p emergent LTCS due to CAT II   - Continue regular diet.  - Increase ambulation.  - Continue motrin, tylenol, oxycodone PRN for pain control.  vs. continue PCEA for pain.  - F/u AM CB- Discussed with the patient and her  the event that occurred yesterday and answered all of her questions-  Spoke with Dr Copeland who reported that in an office gyn chart it is document that the patient has a h/o anxiety and depression    Tiffanie Cabello M.D., M.B.A., M.S. Associate Chief of L & D ( late entry)    OB Progress Note:  Delivery, POD#1    S: 36yo POD#1 s/p emergent LTCS . Patient with several concerns this morning    O:   Vital Signs Last 24 Hrs  T(C): 36.7 (23 Sep 2020 10:16), Max: 36.9 (22 Sep 2020 21:18)  T(F): 98.1 (23 Sep 2020 10:16), Max: 98.4 (22 Sep 2020 21:18)  HR: 100 (23 Sep 2020 10:16) (80 - 110)  BP: 108/70 (23 Sep 2020 10:16) (97/52 - 123/74)  BP(mean): 78 (22 Sep 2020 17:00) (70 - 78)  RR: 18 (23 Sep 2020 10:16) (15 - 20)  SpO2: 98% (23 Sep 2020 10:16) (94% - 100%)    Labs:  Blood type: A Negative  Rubella IgG: RPR:                           8.2<L>   15.10<H> >-----------< 137<L>    (  @ 06:48 )             24.7<L>                        12.6   12.24<H> >-----------< 204    (  @ 04:20 )             38.6        PE:  General: NAD  Abdomen: Mildly distended, appropriately tender  incision c/d/i.   Perinieum: lochia scant  Extremities: No erythema, trace edema    A/P: 36yo POD#1 s/p emergent LTCS due to CAT II   - Continue regular diet.  - Increase ambulation.  - Continue motrin, tylenol, oxycodone PRN for pain control.  vs. continue PCEA for pain.  - F/u AM CB- Discussed with the patient and her  the event that occurred yesterday and answered all of her questions-  Spoke with Dr Copeland who reported that in an office gyn chart it is document that the patient has a h/o anxiety and depression  -Also discussed the issues of the bipolar- SW  currently in the room seein the patient    Tiffanie Cabello M.D., M.B.A., M.S. Associate Chief of L & D ( late entry)     I have not met this patient before today.  she received her care with [facility] and was admitted by Dr Rudolph in early labor and delivered by Dr Gaines due to CAT II  OB Progress Note:  Delivery, POD#1    S: 36yo POD#1 s/p emergent LTCS . Patient with several concerns this morning    O:   Vital Signs Last 24 Hrs  T(C): 36.7 (23 Sep 2020 10:16), Max: 36.9 (22 Sep 2020 21:18)  T(F): 98.1 (23 Sep 2020 10:16), Max: 98.4 (22 Sep 2020 21:18)  HR: 100 (23 Sep 2020 10:16) (80 - 110)  BP: 108/70 (23 Sep 2020 10:16) (97/52 - 123/74)  BP(mean): 78 (22 Sep 2020 17:00) (70 - 78)  RR: 18 (23 Sep 2020 10:16) (15 - 20)  SpO2: 98% (23 Sep 2020 10:16) (94% - 100%)    Labs:  Blood type: A Negative  Rubella IgG: RPR:                           8.2<L>   15.10<H> >-----------< 137<L>    (  @ 06:48 )             24.7<L>                        12.6   12.24<H> >-----------< 204    (  @ 04:20 )             38.6        PE:  General: NAD  Abdomen: Mildly distended, appropriately tender  incision c/d/i.  Perinieum: lochia scant  Extremities: No erythema, trace edema    A/P: 36yo POD#1 s/p emergent LTCS due to CAT II with left lateral extension  - Continue regular diet.  - Increase ambulation.  - Continue motrin, tylenol, oxycodone PRN for pain control.  vs. continue PCEA for pain.  - F/u AM CB- Discussed with the patient and her  the event that occurred yesterday and answered all of her questions-  Spoke with Dr Copeland who reported that in an office gyn chart it is document that the patient has a h/o anxiety and depression  -Also discussed the issues of the bipolar- SW  currently in the room seein the patient    Tiffanie Cabello M.D., M.AMILCARA., M.S.

## 2020-09-24 ENCOUNTER — TRANSCRIPTION ENCOUNTER (OUTPATIENT)
Age: 35
End: 2020-09-24

## 2020-09-24 VITALS
HEART RATE: 99 BPM | OXYGEN SATURATION: 98 % | TEMPERATURE: 98 F | SYSTOLIC BLOOD PRESSURE: 102 MMHG | DIASTOLIC BLOOD PRESSURE: 67 MMHG

## 2020-09-24 LAB — T PALLIDUM AB TITR SER: NEGATIVE — SIGNIFICANT CHANGE UP

## 2020-09-24 RX ORDER — OXYCODONE HYDROCHLORIDE 5 MG/1
1 TABLET ORAL
Qty: 10 | Refills: 0
Start: 2020-09-24

## 2020-09-24 RX ORDER — IBUPROFEN 200 MG
1 TABLET ORAL
Qty: 0 | Refills: 0 | DISCHARGE
Start: 2020-09-24

## 2020-09-24 RX ORDER — ACETAMINOPHEN 500 MG
3 TABLET ORAL
Qty: 0 | Refills: 0 | DISCHARGE
Start: 2020-09-24

## 2020-09-24 RX ADMIN — Medication 975 MILLIGRAM(S): at 02:25

## 2020-09-24 RX ADMIN — Medication 1 TABLET(S): at 11:20

## 2020-09-24 RX ADMIN — HEPARIN SODIUM 5000 UNIT(S): 5000 INJECTION INTRAVENOUS; SUBCUTANEOUS at 09:45

## 2020-09-24 RX ADMIN — Medication 975 MILLIGRAM(S): at 12:15

## 2020-09-24 RX ADMIN — MAGNESIUM HYDROXIDE 30 MILLILITER(S): 400 TABLET, CHEWABLE ORAL at 14:19

## 2020-09-24 RX ADMIN — SIMETHICONE 80 MILLIGRAM(S): 80 TABLET, CHEWABLE ORAL at 14:19

## 2020-09-24 RX ADMIN — Medication 600 MILLIGRAM(S): at 14:18

## 2020-09-24 RX ADMIN — Medication 600 MILLIGRAM(S): at 08:00

## 2020-09-24 RX ADMIN — Medication 975 MILLIGRAM(S): at 03:00

## 2020-09-24 RX ADMIN — Medication 100 MILLIGRAM(S): at 05:06

## 2020-09-24 RX ADMIN — Medication 325 MILLIGRAM(S): at 09:45

## 2020-09-24 RX ADMIN — Medication 975 MILLIGRAM(S): at 11:18

## 2020-09-24 RX ADMIN — Medication 600 MILLIGRAM(S): at 07:24

## 2020-09-24 RX ADMIN — Medication 600 MILLIGRAM(S): at 15:00

## 2020-09-24 RX ADMIN — Medication 25 MILLIGRAM(S): at 02:27

## 2020-09-24 NOTE — DISCHARGE NOTE OB - CARE PROVIDER_API CALL
Carina Arango  OBSTETRICS AND GYNECOLOGY  [address], [address]  [place] [address]  Phone: [phone]  Fax: [phone]  Follow Up Time:

## 2020-09-24 NOTE — PROGRESS NOTE ADULT - PROBLEM SELECTOR PLAN 1
- Continue regular diet  - Increase ambulation  - Continue motrin, tylenol, oxycodone PRN for pain control.   - seen by social work and cleared to go home.  - discharge planning    Simone Castillo, PGY-1

## 2020-09-24 NOTE — DISCHARGE NOTE OB - MEDICATION SUMMARY - MEDICATIONS TO TAKE
I will START or STAY ON the medications listed below when I get home from the hospital:    ibuprofen 600 mg oral tablet  -- 1 tab(s) by mouth every 6 hours, As Needed  -- Indication: For pain    acetaminophen 325 mg oral tablet  -- 3 tab(s) by mouth , As Needed  -- Indication: For pain    oxyCODONE 5 mg oral tablet  -- 1 tab(s) by mouth every 6 hours MDD:4  -- Caution federal law prohibits the transfer of this drug to any person other  than the person for whom it was prescribed.  It is very important that you take or use this exactly as directed.  Do not skip doses or discontinue unless directed by your doctor.  May cause drowsiness or dizziness.  This prescription cannot be refilled.  Using more of this medication than prescribed may cause serious breathing problems.    -- Indication: For pain    Prena1 oral capsule  -- 1 cap(s) by mouth once a day  -- Indication: For prenatal

## 2020-09-24 NOTE — PROGRESS NOTE ADULT - SUBJECTIVE AND OBJECTIVE BOX
OB Progress Note: LTCS, POD#2    S: 36yo with POD#2 s/p pLTCS for NRFHT. Pain well controlled, tolerating regular diet, + BM, voiding spontaneously, ambulating without difficulty. Denies heavy vaginal bleeding, CP/SOB, lightheadedness/dizziness, nausea/vomiting,     O:  Vitals:  Vital Signs Last 24 Hrs  T(C): 36.4 (24 Sep 2020 05:50), Max: 36.8 (23 Sep 2020 14:15)  T(F): 97.6 (24 Sep 2020 05:50), Max: 98.3 (23 Sep 2020 14:15)  HR: 77 (24 Sep 2020 05:50) (77 - 100)  BP: 106/72 (24 Sep 2020 05:50) (106/72 - 117/77)  BP(mean): --  RR: 16 (24 Sep 2020 05:50) (16 - 17)  SpO2: 100% (24 Sep 2020 05:50) (98% - 100%)    MEDICATIONS  (STANDING):  acetaminophen   Tablet .. 975 milliGRAM(s) Oral <User Schedule>  diphtheria/tetanus/pertussis (acellular) Vaccine (ADAcel) 0.5 milliLiter(s) IntraMuscular once  ferrous    sulfate 325 milliGRAM(s) Oral three times a day  heparin   Injectable 5000 Unit(s) SubCutaneous every 12 hours  ibuprofen  Tablet. 600 milliGRAM(s) Oral every 6 hours  prenatal multivitamin 1 Tablet(s) Oral daily  senna 2 Tablet(s) Oral at bedtime      MEDICATIONS  (PRN):  bisacodyl Suppository 10 milliGRAM(s) Rectal daily PRN Constipation  diphenhydrAMINE 25 milliGRAM(s) Oral every 6 hours PRN Itching  lanolin Ointment 1 Application(s) Topical every 6 hours PRN Sore Nipples  magnesium hydroxide Suspension 30 milliLiter(s) Oral two times a day PRN Constipation  ondansetron Injectable 4 milliGRAM(s) IV Push every 6 hours PRN Nausea  oxyCODONE    IR 5 milliGRAM(s) Oral every 3 hours PRN Mild Pain (1 - 3)  oxyCODONE    IR 5 milliGRAM(s) Oral every 3 hours PRN Moderate to Severe Pain (4-10)  oxyCODONE    IR 5 milliGRAM(s) Oral once PRN Moderate to Severe Pain (4-10)  simethicone 80 milliGRAM(s) Chew every 4 hours PRN Gas      Labs:  Blood type: A Negative  Rubella IgG: RPR: Negative                          8.2<L>   15.10<H> >-----------< 137<L>    ( 09-23 @ 06:48 )             24.7<L>                        12.6   12.24<H> >-----------< 204    ( 09-22 @ 04:20 )             38.6                  PE:  General: NAD  Abdomen: Soft, appropriately tender, Fundus firm incision c/d/i.  VE: No heavy vaginal bleeding  Extremities: No erythema, no pitting edema

## 2020-09-24 NOTE — DISCHARGE NOTE OB - HOSPITAL COURSE
Patient had an uncomplicated low transverse  section for NRFHT. Please see operative note for full details.   EBL: 1000  Hct: 38.6-> 24.7  During postpartum course, the patient's vitals were stable, vaginal bleeding appropriate, and pain well controlled.  On day of discharge, the patient was ambulating, had adequate oral intake, voiding freely, and with appropriate pain control.    Discharge instructions and precautions provided. Return to clinic in 2 weeks for an incision check and in 6 weeks for postpartum visit.

## 2020-09-24 NOTE — PROGRESS NOTE ADULT - ATTENDING COMMENTS
Associate Chief of L & D ( late entry)    OB Progress Note:  Delivery, POD#2    S: 34yo POD#2 s/p LTCS .   O:   Vital Signs Last 24 Hrs  T(C): 36.4 (24 Sep 2020 05:50), Max: 36.8 (23 Sep 2020 14:15)  T(F): 97.6 (24 Sep 2020 05:50), Max: 98.3 (23 Sep 2020 14:15)  HR: 77 (24 Sep 2020 05:50) (77 - 100)  BP: 106/72 (24 Sep 2020 05:50) (106/72 - 117/77)  RR: 16 (24 Sep 2020 05:50) (16 - 17)  SpO2: 100% (24 Sep 2020 05:50) (98% - 100%)    Labs:  Blood type: A Negative  Rubella IgG: RPR: Negative                          8.2<L>   15.10<H> >-----------< 137<L>    (  @ 06:48 )             24.7<L>                        12.6   12.24<H> >-----------< 204    (  @ 04:20 )             38.6        PE:  General: NAD  Abdomen: Mildly distended, appropriately tender  incision c/d/i.   Lochia: scant  Extremities: No erythema, no pitting edema    A/P: 34yo POD#2 s/p LTCS for NRFHRT  -Patient is doing well today and she is stable for discharge and will follow up with Dr Dinorah Cabello M.D., M.B.A., M.S.

## 2020-09-24 NOTE — DISCHARGE NOTE OB - ADDITIONAL INSTRUCTIONS
Make your follow-up appointment with your doctor as ordered. No heavy lifting, driving, or strenuous activity for 6 weeks. Nothing per vagina such as tampons, intercourse, douches or tub baths for 6 weeks or until you see your doctor. Call your doctor with any signs and symptoms of infection such as fever, chills, nausea or vomiting. Call your doctor with redness or swelling at the incision site, fluid leakage or wound separation. Call your doctor if you’re unable to tolerate food, increase in vaginal bleeding, or have difficulty urinating. Call your doctor if you have pain that is not relieved by your prescribed medications. Notify your doctor with any other concerns.     Please schedule appointments to see us in the Ob/Gyn clinic.   Please schedule one appointment TWO WEEKS from discharge for a post operative incision check.   Please schedule another appointment SIX WEEKS from discharge for a routine post partum visit.

## 2020-09-24 NOTE — DISCHARGE NOTE OB - MATERIALS PROVIDED
Immunization Record/Guide to Postpartum Care/Vaccinations/Birth Certificate Instructions/Shaken Baby Prevention Handout/Breastfeeding Guide and Packet/[facility] Hearing Screen Program/[facility] [place] Screening Program/Breastfeeding Log/Breastfeeding Mother’s Support Group Information/Back To Sleep Handout   Immunization Record/Breastfeeding Mother’s Support Group Information/Guide to Postpartum Care/Tdap Vaccination (VIS Pub Date: 2012)/Shaken Baby Prevention Handout/Breastfeeding Guide and Packet/Birth Certificate Instructions/[facility] Screening Program/Vaccinations/Breastfeeding Log/Back To Sleep Handout/[facility] Hearing Screen Program

## 2020-09-24 NOTE — DISCHARGE NOTE OB - PLAN OF CARE
recovery Make your follow-up appointment with your doctor as ordered. No heavy lifting, driving, or strenuous activity for 6 weeks. Nothing per vagina such as tampons, intercourse, douches or tub baths for 6 weeks or until you see your doctor. Call your doctor with any signs and symptoms of infection such as fever, chills, nausea or vomiting. Call your doctor with redness or swelling at the incision site, fluid leakage or wound separation. Call your doctor if you’re unable to tolerate food, increase in vaginal bleeding, or have difficulty urinating. Call your doctor if you have pain that is not relieved by your prescribed medications. Notify your doctor with any other concerns.     Please schedule appointments to see us in the Ob/Gyn clinic.   Please schedule one appointment TWO WEEKS from discharge for a post operative incision check.   Please schedule another appointment SIX WEEKS from discharge for a routine post partum visit.

## 2020-09-24 NOTE — DISCHARGE NOTE OB - PATIENT PORTAL LINK FT
You can access the FollowMyHealth Patient Portal offered by [facility] by registering at the following website: [email or web address]. By joining [employer]’s FollowMyHealth portal, you will also be able to view your health information using other applications (apps) compatible with our system.

## 2020-09-24 NOTE — DISCHARGE NOTE OB - CARE PLAN
Principal Discharge DX:	 delivery delivered  Goal:	recovery  Assessment and plan of treatment:	Make your follow-up appointment with your doctor as ordered. No heavy lifting, driving, or strenuous activity for 6 weeks. Nothing per vagina such as tampons, intercourse, douches or tub baths for 6 weeks or until you see your doctor. Call your doctor with any signs and symptoms of infection such as fever, chills, nausea or vomiting. Call your doctor with redness or swelling at the incision site, fluid leakage or wound separation. Call your doctor if you’re unable to tolerate food, increase in vaginal bleeding, or have difficulty urinating. Call your doctor if you have pain that is not relieved by your prescribed medications. Notify your doctor with any other concerns.     Please schedule appointments to see us in the Ob/Gyn clinic.   Please schedule one appointment TWO WEEKS from discharge for a post operative incision check.   Please schedule another appointment SIX WEEKS from discharge for a routine post partum visit.

## 2020-09-25 LAB — RBC # BLD FETUS AUTO: 0 — SIGNIFICANT CHANGE UP

## 2020-10-10 LAB — SURGICAL PATHOLOGY STUDY: SIGNIFICANT CHANGE UP

## 2020-11-02 ENCOUNTER — RESULT REVIEW (OUTPATIENT)
Age: 35
End: 2020-11-02

## 2021-10-22 NOTE — OB RN TRIAGE NOTE - FALL HARM RISK CONCLUSION
From: Nelida Cope  To: [facility] Barrett - CNP  Sent: 10/19/2021 9:09 PM EDT  Subject: Non-Urgent Medical Question    Lane Moise,  I wanted to let you know that Benson Valdez has had a sore throat for a couple of days and today he felt congestion and tightness in his chest. Tonight he started taking the doxycycline.      Thanks,  [facility] [place]
Patient is feeling worse the antibiotic doesn't seem to be helping please advise with what you would like to do next.  Call   girlfriend [facility] at [phone]
[employer] Safety Interventions

## 2022-03-16 NOTE — OB PROVIDER TRIAGE NOTE - NSOBPROVIDERNOTE_OBGYN_ALL_OB_FT
Patient is okay waiting until Monday to fill the script.  Patient states she had [facility] fill the last two scripts.   Patient will contact [facility] to have them request the script on Tuesday   Evidence of labor, discussed findings with Dr. Rudolph.   -Admit to L&D  -Expectant management

## 2022-05-27 NOTE — OB RN PATIENT PROFILE - HEALTH/HEALTHCARE ANXIETIES, PROFILE
[facility]  [address] [place] [address]  Office: [phone]   Fax:    [phone]          Try to take a deep breath when you feel as if your anger is building and step away.  Tell the person with whom you are interacting that you need to to step away and take a break and you will contact them later.    A mental health coordinator will call you to schedule anger management counseling.    Return in about 6 weeks (around 7/8/2022) for anxiety/ anger management  follow up, w/ Dr. ORTEZ for  telephone visit .     Future Appointments 5/27/2022 - 11/23/2022                  Date Visit Type Length Department Provider     6/3/2022  9:40 AM PREVENTATIVE ADULT            40 min  FAMILY PRACTICE Jenifer Sherwood MD              7/8/2022 11:20 AM OFFICE VISIT 40 min [facility] PRACTICE Jenifer Sherwood MD                           Thank you so much for doing a telephone visit with us today. And, again, thank you  for choosing our [facility].  Please contact us with any questions that you may have.   We appreciate the opportunity to serve you now and look forward to supporting your healthcare needs for a long time to come!    Our clinic for the foreseeable future and until further notice , will continue to offer virtual visits, including telephone visits, video visits,  and e-visits, especially during this period of COVID-19 coronavirus pandemic.  Please call to schedule another one if you need it!        Most Sincerely,     Jenifer Sherwood MD                                              To reach your [facility] care team after hours call:   [phone]    PLEASE NOTE OUR HOURS HAVE CHANGED secondary to COVID-19 coronavirus pandemic, as we are trying to minimize patient exposure to the virus,  which is now widespread in the [facility].  These hours may change with very little notice.  We apologize for any  inconvenience.       Our current clinic hours are:    Our current clinic hours are:         Monday- Thursday   7:00am - 6:00pm  in person.      Friday  7:00am- 5:00pm                       Saturday and Sunday : Closed to in person and virtual visits        We have telephone and virtual visit times available between    7:00am - 6pm on Monday-Friday as well.                                                Phone:  [phone]      Our pharmacy hours:Monday  through Friday 8:00am to 5:00pm                        Saturday - 9:00 am to 12 noon         Sunday : Closed.                ###  Please note: at this time we are not accepting any walk-in visits. ###      There is also information available at our web site:  [email or web address]    If your provider ordered any lab tests and you do not receive the results within 10 business days, please call the clinic.    If you need a medication refill please contact your pharmacy.  Please allow 3 business days for your refill to be completed.    Our clinic offers telephone visits and e visits.  Please ask one of your team members to explain more.      Use [employer] (secure email communication and access to your chart) to send your primary care provider a message or make an appointment. Ask someone on your Team how to sign up for [employer].     Pharmacy is drive-thru only at this time secondary to the COVID-19 coronavirus pandemic, as we are trying to minimize patient exposure to the virus,  which is now widespread in the state.        There is also information available at our web site:  [email or web address]    If your provider ordered any lab tests and you do not receive the results within 10 business days, please call the clinic.    If you need a medication refill please contact your pharmacy.  Please allow 2 business days for your refill to be completed.               none

## 2022-06-30 ENCOUNTER — EMERGENCY (EMERGENCY)
Facility: HOSPITAL | Age: 37
LOS: 1 days | Discharge: HOME | End: 2022-06-30
Attending: EMERGENCY MEDICINE
Payer: COMMERCIAL

## 2022-06-30 VITALS
TEMPERATURE: 98 F | OXYGEN SATURATION: 100 % | SYSTOLIC BLOOD PRESSURE: 112 MMHG | DIASTOLIC BLOOD PRESSURE: 74 MMHG | HEART RATE: 91 BPM | RESPIRATION RATE: 16 BRPM

## 2022-06-30 DIAGNOSIS — Z90.89 ACQUIRED ABSENCE OF OTHER ORGANS: Chronic | ICD-10-CM

## 2022-06-30 PROCEDURE — 99283 EMERGENCY DEPT VISIT LOW MDM: CPT

## 2022-06-30 PROCEDURE — 99284 EMERGENCY DEPT VISIT MOD MDM: CPT

## 2022-06-30 PROCEDURE — 93010 ELECTROCARDIOGRAM REPORT: CPT

## 2022-06-30 PROCEDURE — 82962 GLUCOSE BLOOD TEST: CPT

## 2022-06-30 PROCEDURE — 93005 ELECTROCARDIOGRAM TRACING: CPT

## 2022-06-30 RX ORDER — ACETAMINOPHEN 500 MG
975 TABLET ORAL ONCE
Refills: 0 | Status: COMPLETED | OUTPATIENT
Start: 2022-06-30 | End: 2022-06-30

## 2022-06-30 RX ADMIN — Medication 975 MILLIGRAM(S): at 15:12

## 2022-06-30 NOTE — ED PROVIDER NOTE - NSFOLLOWUPINSTRUCTIONS_ED_ALL_ED_FT
Follow up with your Primary Care Physician within the next 2-3 days  Bring a copy of your test results with you to your appointment  Continue your current medication regimen  Return to the Emergency Room if you experience new or worsening symptoms abdominal pain, nausea, vomiting, fever chills, cough, chest pain, shortness of breath, dizziness, slurred speech, weakness, gait abnormality       [facility]  [address] [place] [address], [address]  [place] [address]  Phone: [phone]

## 2022-06-30 NOTE — ED PROVIDER NOTE - PATIENT PORTAL LINK FT
You can access the FollowMyHealth Patient Portal offered by [facility] by registering at the following website: [email or web address]. By joining [facility]’s FollowMyHealth portal, you will also be able to view your health information using other applications (apps) compatible with our system.

## 2022-06-30 NOTE — ED PROVIDER NOTE - CLINICAL SUMMARY MEDICAL DECISION MAKING FREE TEXT BOX
Michela: 37 year old female with near syncope while walking from car to diner. flushing sensation. no syncope.  has not eaten this am. prior episods of vasovagal syncope. flor check ekg, fingerstick, uhcg, reassess.

## 2022-06-30 NOTE — ED PROVIDER NOTE - CARE PROVIDER_API CALL
Jarred Saha (DO)  Cardiology; Internal Medicine  [address] [place] [address], [address]  [place] [address]  Phone: [phone]  Fax: [phone]  Follow Up Time:

## 2022-06-30 NOTE — ED ADULT NURSE NOTE - OBJECTIVE STATEMENT
36 yo F with pmhx anxiety/depression was BIBA for near syncope 30 minutes PTA. 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 denies LOC or injury. , VSS on arrival NSR on CCM. Lungs cta, abd soft, nt/nd. denies chest pain, shortness of breath, headache, nausea, vomiting, diarrhea, abdominal pain, fevers, chills, urinary symptoms.

## 2022-11-07 ENCOUNTER — NON-APPOINTMENT (OUTPATIENT)
Age: 37
End: 2022-11-07

## 2022-11-08 ENCOUNTER — EMERGENCY (EMERGENCY)
Facility: HOSPITAL | Age: 37
LOS: 1 days | Discharge: HOME | End: 2022-11-08
Attending: EMERGENCY MEDICINE | Admitting: EMERGENCY MEDICINE

## 2022-11-08 VITALS
HEART RATE: 84 BPM | RESPIRATION RATE: 16 BRPM | TEMPERATURE: 98 F | SYSTOLIC BLOOD PRESSURE: 107 MMHG | DIASTOLIC BLOOD PRESSURE: 91 MMHG | OXYGEN SATURATION: 100 %

## 2022-11-08 DIAGNOSIS — Z90.89 ACQUIRED ABSENCE OF OTHER ORGANS: Chronic | ICD-10-CM

## 2022-11-08 LAB
ALBUMIN SERPL ELPH-MCNC: 4.6 G/DL — SIGNIFICANT CHANGE UP (ref 3.3–5)
ALP SERPL-CCNC: 38 U/L — LOW (ref 40–120)
ALT FLD-CCNC: 20 U/L — SIGNIFICANT CHANGE UP (ref 4–33)
ANION GAP SERPL CALC-SCNC: 13 MMOL/L — SIGNIFICANT CHANGE UP (ref 7–14)
APPEARANCE UR: CLEAR — SIGNIFICANT CHANGE UP
APTT BLD: 26.9 SEC — LOW (ref 27–36.3)
AST SERPL-CCNC: 26 U/L — SIGNIFICANT CHANGE UP (ref 4–32)
B PERT DNA SPEC QL NAA+PROBE: SIGNIFICANT CHANGE UP
B PERT+PARAPERT DNA PNL SPEC NAA+PROBE: SIGNIFICANT CHANGE UP
BASOPHILS # BLD AUTO: 0.08 K/UL — SIGNIFICANT CHANGE UP (ref 0–0.2)
BASOPHILS NFR BLD AUTO: 0.9 % — SIGNIFICANT CHANGE UP (ref 0–2)
BILIRUB SERPL-MCNC: 0.3 MG/DL — SIGNIFICANT CHANGE UP (ref 0.2–1.2)
BILIRUB UR-MCNC: NEGATIVE — SIGNIFICANT CHANGE UP
BORDETELLA PARAPERTUSSIS (RAPRVP): SIGNIFICANT CHANGE UP
BUN SERPL-MCNC: 7 MG/DL — SIGNIFICANT CHANGE UP (ref 7–23)
C PNEUM DNA SPEC QL NAA+PROBE: SIGNIFICANT CHANGE UP
CALCIUM SERPL-MCNC: 10.1 MG/DL — SIGNIFICANT CHANGE UP (ref 8.4–10.5)
CHLORIDE SERPL-SCNC: 103 MMOL/L — SIGNIFICANT CHANGE UP (ref 98–107)
CO2 SERPL-SCNC: 21 MMOL/L — LOW (ref 22–31)
COLOR SPEC: COLORLESS — SIGNIFICANT CHANGE UP
CREAT SERPL-MCNC: 0.72 MG/DL — SIGNIFICANT CHANGE UP (ref 0.5–1.3)
DIFF PNL FLD: NEGATIVE — SIGNIFICANT CHANGE UP
EGFR: 110 ML/MIN/1.73M2 — SIGNIFICANT CHANGE UP
EOSINOPHIL # BLD AUTO: 0.38 K/UL — SIGNIFICANT CHANGE UP (ref 0–0.5)
EOSINOPHIL NFR BLD AUTO: 4.1 % — SIGNIFICANT CHANGE UP (ref 0–6)
FLUAV SUBTYP SPEC NAA+PROBE: SIGNIFICANT CHANGE UP
FLUBV RNA SPEC QL NAA+PROBE: SIGNIFICANT CHANGE UP
GLUCOSE SERPL-MCNC: 116 MG/DL — HIGH (ref 70–99)
GLUCOSE UR QL: NEGATIVE — SIGNIFICANT CHANGE UP
HADV DNA SPEC QL NAA+PROBE: SIGNIFICANT CHANGE UP
HCOV 229E RNA SPEC QL NAA+PROBE: SIGNIFICANT CHANGE UP
HCOV HKU1 RNA SPEC QL NAA+PROBE: SIGNIFICANT CHANGE UP
HCOV NL63 RNA SPEC QL NAA+PROBE: SIGNIFICANT CHANGE UP
HCOV OC43 RNA SPEC QL NAA+PROBE: SIGNIFICANT CHANGE UP
HCT VFR BLD CALC: 44.2 % — SIGNIFICANT CHANGE UP (ref 34.5–45)
HGB BLD-MCNC: 14.6 G/DL — SIGNIFICANT CHANGE UP (ref 11.5–15.5)
HMPV RNA SPEC QL NAA+PROBE: SIGNIFICANT CHANGE UP
HPIV1 RNA SPEC QL NAA+PROBE: SIGNIFICANT CHANGE UP
HPIV2 RNA SPEC QL NAA+PROBE: SIGNIFICANT CHANGE UP
HPIV3 RNA SPEC QL NAA+PROBE: SIGNIFICANT CHANGE UP
HPIV4 RNA SPEC QL NAA+PROBE: SIGNIFICANT CHANGE UP
IANC: 3.49 K/UL — SIGNIFICANT CHANGE UP (ref 1.8–7.4)
IMM GRANULOCYTES NFR BLD AUTO: 0.2 % — SIGNIFICANT CHANGE UP (ref 0–0.9)
INR BLD: 0.99 RATIO — SIGNIFICANT CHANGE UP (ref 0.88–1.16)
KETONES UR-MCNC: NEGATIVE — SIGNIFICANT CHANGE UP
LEUKOCYTE ESTERASE UR-ACNC: NEGATIVE — SIGNIFICANT CHANGE UP
LYMPHOCYTES # BLD AUTO: 4.8 K/UL — HIGH (ref 1–3.3)
LYMPHOCYTES # BLD AUTO: 52.3 % — HIGH (ref 13–44)
M PNEUMO DNA SPEC QL NAA+PROBE: SIGNIFICANT CHANGE UP
MCHC RBC-ENTMCNC: 28.9 PG — SIGNIFICANT CHANGE UP (ref 27–34)
MCHC RBC-ENTMCNC: 33 GM/DL — SIGNIFICANT CHANGE UP (ref 32–36)
MCV RBC AUTO: 87.4 FL — SIGNIFICANT CHANGE UP (ref 80–100)
MONOCYTES # BLD AUTO: 0.4 K/UL — SIGNIFICANT CHANGE UP (ref 0–0.9)
MONOCYTES NFR BLD AUTO: 4.4 % — SIGNIFICANT CHANGE UP (ref 2–14)
NEUTROPHILS # BLD AUTO: 3.49 K/UL — SIGNIFICANT CHANGE UP (ref 1.8–7.4)
NEUTROPHILS NFR BLD AUTO: 38.1 % — LOW (ref 43–77)
NITRITE UR-MCNC: NEGATIVE — SIGNIFICANT CHANGE UP
NRBC # BLD: 0 /100 WBCS — SIGNIFICANT CHANGE UP (ref 0–0)
NRBC # FLD: 0 K/UL — SIGNIFICANT CHANGE UP (ref 0–0)
PH UR: 6.5 — SIGNIFICANT CHANGE UP (ref 5–8)
PLATELET # BLD AUTO: 244 K/UL — SIGNIFICANT CHANGE UP (ref 150–400)
POTASSIUM SERPL-MCNC: 4.4 MMOL/L — SIGNIFICANT CHANGE UP (ref 3.5–5.3)
POTASSIUM SERPL-SCNC: 4.4 MMOL/L — SIGNIFICANT CHANGE UP (ref 3.5–5.3)
PROT SERPL-MCNC: 8.1 G/DL — SIGNIFICANT CHANGE UP (ref 6–8.3)
PROT UR-MCNC: NEGATIVE — SIGNIFICANT CHANGE UP
PROTHROM AB SERPL-ACNC: 11.5 SEC — SIGNIFICANT CHANGE UP (ref 10.5–13.4)
RAPID RVP RESULT: SIGNIFICANT CHANGE UP
RBC # BLD: 5.06 M/UL — SIGNIFICANT CHANGE UP (ref 3.8–5.2)
RBC # FLD: 11.9 % — SIGNIFICANT CHANGE UP (ref 10.3–14.5)
RSV RNA SPEC QL NAA+PROBE: SIGNIFICANT CHANGE UP
RV+EV RNA SPEC QL NAA+PROBE: SIGNIFICANT CHANGE UP
SARS-COV-2 RNA SPEC QL NAA+PROBE: SIGNIFICANT CHANGE UP
SODIUM SERPL-SCNC: 137 MMOL/L — SIGNIFICANT CHANGE UP (ref 135–145)
SP GR SPEC: 1 — LOW (ref 1.01–1.05)
UFH PPP CHRO-ACNC: 0.02 IU/ML — LOW (ref 0.3–0.7)
UROBILINOGEN FLD QL: SIGNIFICANT CHANGE UP
WBC # BLD: 9.17 K/UL — SIGNIFICANT CHANGE UP (ref 3.8–10.5)
WBC # FLD AUTO: 9.17 K/UL — SIGNIFICANT CHANGE UP (ref 3.8–10.5)

## 2022-11-08 PROCEDURE — 99285 EMERGENCY DEPT VISIT HI MDM: CPT

## 2022-11-08 PROCEDURE — 70498 CT ANGIOGRAPHY NECK: CPT | Mod: 26,MA

## 2022-11-08 PROCEDURE — 70496 CT ANGIOGRAPHY HEAD: CPT | Mod: 26,MA

## 2022-11-08 RX ORDER — MECLIZINE HCL 12.5 MG
25 TABLET ORAL ONCE
Refills: 0 | Status: COMPLETED | OUTPATIENT
Start: 2022-11-08 | End: 2022-11-08

## 2022-11-08 RX ORDER — KETOROLAC TROMETHAMINE 30 MG/ML
15 SYRINGE (ML) INJECTION ONCE
Refills: 0 | Status: DISCONTINUED | OUTPATIENT
Start: 2022-11-08 | End: 2022-11-08

## 2022-11-08 RX ORDER — SODIUM CHLORIDE 9 MG/ML
1000 INJECTION INTRAMUSCULAR; INTRAVENOUS; SUBCUTANEOUS ONCE
Refills: 0 | Status: COMPLETED | OUTPATIENT
Start: 2022-11-08 | End: 2022-11-08

## 2022-11-08 RX ORDER — MECLIZINE HCL 12.5 MG
1 TABLET ORAL
Qty: 9 | Refills: 0
Start: 2022-11-08 | End: 2022-11-10

## 2022-11-08 RX ADMIN — Medication 25 MILLIGRAM(S): at 19:40

## 2022-11-08 RX ADMIN — Medication 15 MILLIGRAM(S): at 19:40

## 2022-11-08 RX ADMIN — SODIUM CHLORIDE 1000 MILLILITER(S): 9 INJECTION INTRAMUSCULAR; INTRAVENOUS; SUBCUTANEOUS at 20:14

## 2022-11-08 NOTE — ED PROVIDER NOTE - PATIENT PORTAL LINK FT
You can access the FollowMyHealth Patient Portal offered by [facility] by registering at the following website: [email or web address]. By joining [employer]’s FollowMyHealth portal, you will also be able to view your health information using other applications (apps) compatible with our system.

## 2022-11-08 NOTE — ED PROVIDER NOTE - OBJECTIVE STATEMENT
36 yo female no pmh c/o dizziness x4 days. Pt admits to developing gradual onset frontal headache x4 days ago. Pt then had intermittent lightheadedness and photophobia. Pt states that she is having trouble focusing her eyes on things because it aggravates her dizziness. Pt had an episode of near syncope today at work prompting ER visit today. Pt denies chest pain, sob, abd pain, n/v/d, numbness, neck pain, change in vision, slurred speech, fever or chills.

## 2022-11-08 NOTE — ED PROVIDER NOTE - ATTENDING APP SHARED VISIT CONTRIBUTION OF CARE
Patient with history of juvenile migraines, depression, anxiety presents to the emergency department with 4 days of intermittent lightheadedness, presyncopal, worsening of symptoms when trying to focus eyes on an object or to read.  Denies nausea, vomiting, fevers, chest pain, shortness of breath, palpitations. Patient has nonfocal neurological exam, VSS. Suspect ocular migraine, will treat symptomatically, obtain basic labs, CT angio of the head and neck though low concern for any vertebral or carotid dissection, critical blockage, stroke.

## 2022-11-08 NOTE — ED ADULT NURSE NOTE - OBJECTIVE STATEMENT
Patient arrives to the ED for dizziness x4 days. A&Ox4. Patient reports she has also had a headache x4  days. Patient reports intermittent lightheadedness but she is able to "talk herself down". Patient reports photophobia and pain when she closes her eyes all the way. Patient reports she is due for a new eye exam and has an appt tomorrow. Patient reports she wears glasses and contacts.  Patient reports when she focuses her dizziness gets worse. Patient denies any chest pain, SOB, nausea, vomiting, abd pain, neck pain, or slurred speech. Patient able to ambulate without difficulty but reports "unsteady gait" and feeling cold. No acute distress. Patient breathing even and nonlabored. 20g IV placed to left arm. Labs sent as ordered. COVID swab sent. Patient medicated as ordered. Safety maintained. Patient stable upon being taken to CT.

## 2022-11-08 NOTE — ED PROVIDER NOTE - PROGRESS NOTE DETAILS
Symptoms improved with meclizine and toradol, feeling better, no acute findings, d/c with outpt f/u and return precautions.

## 2022-11-08 NOTE — ED PROVIDER NOTE - CLINICAL SUMMARY MEDICAL DECISION MAKING FREE TEXT BOX
38 y/o female c/o headache, dizziness x 4 days- will check labs, ekg, CTA head/neck, pain control, reassess

## 2023-02-18 ENCOUNTER — NON-APPOINTMENT (OUTPATIENT)
Age: 38
End: 2023-02-18

## 2023-09-29 NOTE — ED PROVIDER NOTE - SECONDARY DIAGNOSIS.
[facility]    Who is the name of the provider?:  Dr Mazariegos    What is the location you see this provider at/preferred location?: [facility]  Person calling / Facility: [facility]  Phone number:  [phone]  Nurse call back needed:  YES     Reason for call:  Patient calling wanting to know if she is done taking the medication prescribed by Dr Mazariegos after her heart surgery. Patient states she called to get a refill and there was no refills left but pharmacy informed patient the medication is a heart medication.  Patient would like a call from RN to advise.    Pharmacy location:  [facility] [place]  Outside Imaging: n/a   Can we leave a detailed message on this number?  N/a     Dizziness

## 2023-10-23 ENCOUNTER — NON-APPOINTMENT (OUTPATIENT)
Age: 38
End: 2023-10-23

## 2023-12-26 ENCOUNTER — APPOINTMENT (OUTPATIENT)
Dept: MAMMOGRAPHY | Facility: IMAGING CENTER | Age: 38
End: 2023-12-26

## 2024-01-22 ENCOUNTER — APPOINTMENT (OUTPATIENT)
Dept: MAMMOGRAPHY | Facility: CLINIC | Age: 39
End: 2024-01-22

## 2024-02-20 ENCOUNTER — OUTPATIENT (OUTPATIENT)
Dept: OUTPATIENT SERVICES | Facility: HOSPITAL | Age: 39
LOS: 1 days | End: 2024-02-20
Payer: COMMERCIAL

## 2024-02-20 ENCOUNTER — APPOINTMENT (OUTPATIENT)
Dept: MAMMOGRAPHY | Facility: IMAGING CENTER | Age: 39
End: 2024-02-20
Payer: COMMERCIAL

## 2024-02-20 DIAGNOSIS — Z90.89 ACQUIRED ABSENCE OF OTHER ORGANS: Chronic | ICD-10-CM

## 2024-02-20 DIAGNOSIS — Z00.8 ENCOUNTER FOR OTHER GENERAL EXAMINATION: ICD-10-CM

## 2024-02-20 PROCEDURE — 77067 SCR MAMMO BI INCL CAD: CPT

## 2024-02-20 PROCEDURE — 77063 BREAST TOMOSYNTHESIS BI: CPT | Mod: 26

## 2024-02-20 PROCEDURE — 77063 BREAST TOMOSYNTHESIS BI: CPT

## 2024-02-20 PROCEDURE — 77067 SCR MAMMO BI INCL CAD: CPT | Mod: 26

## 2024-06-17 ENCOUNTER — NON-APPOINTMENT (OUTPATIENT)
Age: 39
End: 2024-06-17

## 2024-06-17 ENCOUNTER — APPOINTMENT (OUTPATIENT)
Dept: ANTEPARTUM | Facility: CLINIC | Age: 39
End: 2024-06-17
Payer: COMMERCIAL

## 2024-06-17 ENCOUNTER — ASOB RESULT (OUTPATIENT)
Age: 39
End: 2024-06-17

## 2024-06-17 PROCEDURE — 76813 OB US NUCHAL MEAS 1 GEST: CPT

## 2024-06-17 PROCEDURE — 76801 OB US < 14 WKS SINGLE FETUS: CPT

## 2024-08-05 ENCOUNTER — ASOB RESULT (OUTPATIENT)
Age: 39
End: 2024-08-05

## 2024-08-05 ENCOUNTER — APPOINTMENT (OUTPATIENT)
Dept: ANTEPARTUM | Facility: CLINIC | Age: 39
End: 2024-08-05

## 2024-08-05 PROCEDURE — 76811 OB US DETAILED SNGL FETUS: CPT

## 2024-10-28 ENCOUNTER — NON-APPOINTMENT (OUTPATIENT)
Age: 39
End: 2024-10-28

## 2024-10-28 ENCOUNTER — APPOINTMENT (OUTPATIENT)
Dept: ANTEPARTUM | Facility: CLINIC | Age: 39
End: 2024-10-28

## 2025-04-23 NOTE — OB PROVIDER TRIAGE NOTE - NS_PHYSICALALLNEG_OBGYN_ALL_OB
What Is The Reason For Today's Visit?: Full Body Skin Examination What Is The Reason For Today's Visit? (Being Monitored For X): the development of new lesions All other review of systems negative, except as noted in HPI